# Patient Record
Sex: FEMALE | Race: WHITE | URBAN - METROPOLITAN AREA
[De-identification: names, ages, dates, MRNs, and addresses within clinical notes are randomized per-mention and may not be internally consistent; named-entity substitution may affect disease eponyms.]

---

## 2022-04-12 ENCOUNTER — OFFICE VISIT (OUTPATIENT)
Dept: URBAN - METROPOLITAN AREA CLINIC 17 | Facility: CLINIC | Age: 42
End: 2022-04-12

## 2022-04-12 DIAGNOSIS — H52.13 MYOPIA, BILATERAL: Primary | ICD-10-CM

## 2022-04-12 PROCEDURE — 92310 CONTACT LENS FITTING OU: CPT | Performed by: OPTOMETRIST

## 2022-04-12 PROCEDURE — 92002 INTRM OPH EXAM NEW PATIENT: CPT | Performed by: OPTOMETRIST

## 2022-04-12 ASSESSMENT — VISUAL ACUITY
OD: 20/20
OS: 20/20

## 2022-04-12 ASSESSMENT — INTRAOCULAR PRESSURE
OS: 32
OD: 33

## 2022-04-12 NOTE — IMPRESSION/PLAN
Impression: Myopia, bilateral: H52.13. Plan: New [facility] Rx dispensed adjustment expected. Discussed change in Rx with patient. Printed copy of Rx given to patient today.  renewed [facility] Rx OU